# Patient Record
Sex: FEMALE | Race: WHITE | NOT HISPANIC OR LATINO | Employment: UNEMPLOYED | URBAN - METROPOLITAN AREA
[De-identification: names, ages, dates, MRNs, and addresses within clinical notes are randomized per-mention and may not be internally consistent; named-entity substitution may affect disease eponyms.]

---

## 2014-01-23 LAB
PAP RECOMMENDATION EXT: NORMAL
PAP SMEAR: NORMAL

## 2022-08-23 ENCOUNTER — HOSPITAL ENCOUNTER (EMERGENCY)
Facility: HOSPITAL | Age: 42
Discharge: HOME | End: 2022-08-23
Attending: SPECIALIST
Payer: OTHER GOVERNMENT

## 2022-08-23 VITALS
OXYGEN SATURATION: 99 % | TEMPERATURE: 98 F | HEIGHT: 57 IN | DIASTOLIC BLOOD PRESSURE: 79 MMHG | SYSTOLIC BLOOD PRESSURE: 129 MMHG | WEIGHT: 107 LBS | RESPIRATION RATE: 16 BRPM | BODY MASS INDEX: 23.08 KG/M2 | HEART RATE: 79 BPM

## 2022-08-23 DIAGNOSIS — G43.009 MIGRAINE WITHOUT AURA AND WITHOUT STATUS MIGRAINOSUS, NOT INTRACTABLE: Primary | ICD-10-CM

## 2022-08-23 DIAGNOSIS — U07.1 COVID-19: ICD-10-CM

## 2022-08-23 PROCEDURE — 63600175 PHARM REV CODE 636 W HCPCS: Performed by: NURSE PRACTITIONER

## 2022-08-23 PROCEDURE — 25000003 PHARM REV CODE 250: Performed by: NURSE PRACTITIONER

## 2022-08-23 PROCEDURE — 96374 THER/PROPH/DIAG INJ IV PUSH: CPT

## 2022-08-23 PROCEDURE — 99285 EMERGENCY DEPT VISIT HI MDM: CPT | Mod: 25

## 2022-08-23 RX ORDER — BUTALBITAL, ACETAMINOPHEN AND CAFFEINE 50; 325; 40 MG/1; MG/1; MG/1
1 TABLET ORAL EVERY 6 HOURS PRN
Qty: 15 TABLET | Refills: 0 | Status: SHIPPED | OUTPATIENT
Start: 2022-08-23 | End: 2022-09-22

## 2022-08-23 RX ORDER — HYDROCODONE BITARTRATE AND ACETAMINOPHEN 5; 325 MG/1; MG/1
1 TABLET ORAL
Status: COMPLETED | OUTPATIENT
Start: 2022-08-23 | End: 2022-08-23

## 2022-08-23 RX ORDER — DEXAMETHASONE SODIUM PHOSPHATE 4 MG/ML
8 INJECTION, SOLUTION INTRA-ARTICULAR; INTRALESIONAL; INTRAMUSCULAR; INTRAVENOUS; SOFT TISSUE
Status: COMPLETED | OUTPATIENT
Start: 2022-08-23 | End: 2022-08-23

## 2022-08-23 RX ADMIN — HYDROCODONE BITARTRATE AND ACETAMINOPHEN 1 TABLET: 5; 325 TABLET ORAL at 06:08

## 2022-08-23 RX ADMIN — DEXAMETHASONE SODIUM PHOSPHATE 8 MG: 4 INJECTION, SOLUTION INTRA-ARTICULAR; INTRALESIONAL; INTRAMUSCULAR; INTRAVENOUS; SOFT TISSUE at 06:08

## 2022-08-23 NOTE — Clinical Note
"Buffy Turnernifer" Osullivan was seen and treated in our emergency department on 8/23/2022.  She may return to work on 08/25/2022.       If you have any questions or concerns, please don't hesitate to call.      CLARENCE Vidal"

## 2022-08-23 NOTE — DISCHARGE INSTRUCTIONS
Fioricet as needed for headache  For continued symptoms after other symptoms of COVID have resolved then see your primary care physician for further workup or referral to neurology

## 2022-08-23 NOTE — ED PROVIDER NOTES
"Encounter Date: 8/23/2022       History     Chief Complaint   Patient presents with    Headache     Pt reports testing positive for COVID Saturday. Pt reports "my head is shocking me to the left side behind my ear". Pt denies hx of migraines.      42 year old female presents to ER complaining of sharp stabbing pain to left side of head for about 1-2 hours. She describes pain as a shocking pain. She states when the pain hits it causes her to have dizziness. She denies unilateral weakness, nausea, vomiting or vision changes. She was diagnosed with COVID on Saturday.     The history is provided by the patient. No  was used.     Review of patient's allergies indicates:  No Known Allergies  No past medical history on file.  No past surgical history on file.  No family history on file.     Review of Systems   Constitutional: Negative for chills and fever.   Eyes: Negative for visual disturbance.   Respiratory: Negative for shortness of breath.    Gastrointestinal: Negative for vomiting.   Skin: Negative for rash.   Neurological: Positive for dizziness and headaches. Negative for seizures, syncope and weakness.       Physical Exam     Initial Vitals [08/23/22 1717]   BP Pulse Resp Temp SpO2   129/79 79 18 97.7 °F (36.5 °C) 99 %      MAP       --         Physical Exam    Constitutional: She appears well-developed and well-nourished.   HENT:   Head: Normocephalic and atraumatic.   Right Ear: Tympanic membrane normal.   Left Ear: Tympanic membrane normal.   Eyes: EOM are normal.   Neck: Neck supple.   Normal range of motion.  Cardiovascular: Normal rate and regular rhythm.   Pulmonary/Chest: Breath sounds normal. No respiratory distress.   Abdominal: She exhibits no distension.   Musculoskeletal:         General: Normal range of motion.      Cervical back: Normal range of motion and neck supple.     Neurological: She is alert and oriented to person, place, and time. She has normal strength. No cranial " nerve deficit. Coordination and gait normal. GCS eye subscore is 4. GCS verbal subscore is 5. GCS motor subscore is 6.   No pronator drift. Symmetrical facial movements. Heel-to-shin normal bilaterally.    Skin: Skin is warm and dry. Capillary refill takes less than 2 seconds. No rash noted.   Psychiatric: She has a normal mood and affect.         ED Course   Procedures  Labs Reviewed - No data to display       Imaging Results          CT Head Without Contrast (Final result)  Result time 08/23/22 17:56:06    Final result by Carmelo Verma MD (08/23/22 17:56:06)                 Impression:      No acute intracranial findings.      Electronically signed by: Carmelo Verma  Date:    08/23/2022  Time:    17:56             Narrative:    EXAMINATION:  CT HEAD WITHOUT CONTRAST    CLINICAL HISTORY:  Headache, sudden, severe;    TECHNIQUE:  CT imaging of the head performed from the skull base to the vertex without intravenous contrast. DLP 1105 mGycm. Automatic exposure control, adjustment of mA/kV or iterative reconstruction technique was used to reduce radiation.    COMPARISON:  None Available.    FINDINGS:  There is no acute cortical infarct, hemorrhage or mass lesion.  The ventricles are normal in size.    Visualized paranasal sinuses and mastoid air cells are clear.                                 Medications   HYDROcodone-acetaminophen 5-325 mg per tablet 1 tablet (has no administration in time range)   dexamethasone injection 8 mg (8 mg Intravenous Given 8/23/22 1805)     Medical Decision Making:   Differential Diagnosis:   COVID, viral syndrome, ICH, migraine  Clinical Tests:   Radiological Study: Ordered and Reviewed  ED Management:  CT scan of brain negative.  Patient is COVID positive and suspect her symptoms are related to virus.  Patient given Decadron IV in ER and will be discharged home with pain medications to follow-up with her primary care physician is for continuation of symptoms after her 5 days of  quarantine.  Patient remains hemodynamically stable and neurologically intact.                      Clinical Impression:   Final diagnoses:  [G43.009] Migraine without aura and without status migrainosus, not intractable (Primary)  [U07.1] COVID-19          ED Disposition Condition    Discharge Stable        ED Prescriptions     Medication Sig Dispense Start Date End Date Auth. Provider    butalbital-acetaminophen-caffeine -40 mg (FIORICET, ESGIC) -40 mg per tablet Take 1 tablet by mouth every 6 (six) hours as needed for Headaches. 15 tablet 8/23/2022 9/22/2022 CLARENCE Vidal        Follow-up Information    None          CLARENCE Vidal  08/23/22 2118

## 2023-09-21 ENCOUNTER — OFFICE VISIT (OUTPATIENT)
Dept: FAMILY MEDICINE | Facility: CLINIC | Age: 43
End: 2023-09-21
Payer: OTHER GOVERNMENT

## 2023-09-21 VITALS
DIASTOLIC BLOOD PRESSURE: 98 MMHG | HEIGHT: 57 IN | TEMPERATURE: 98 F | WEIGHT: 108.63 LBS | SYSTOLIC BLOOD PRESSURE: 149 MMHG | HEART RATE: 73 BPM | RESPIRATION RATE: 20 BRPM | BODY MASS INDEX: 23.43 KG/M2 | OXYGEN SATURATION: 100 %

## 2023-09-21 DIAGNOSIS — Z98.890 H/O OVARIAN CYSTECTOMY: ICD-10-CM

## 2023-09-21 DIAGNOSIS — I10 HYPERTENSION, UNSPECIFIED TYPE: ICD-10-CM

## 2023-09-21 DIAGNOSIS — Z87.42 H/O OVARIAN CYSTECTOMY: ICD-10-CM

## 2023-09-21 DIAGNOSIS — Z12.31 BREAST CANCER SCREENING BY MAMMOGRAM: ICD-10-CM

## 2023-09-21 DIAGNOSIS — Z76.89 ENCOUNTER TO ESTABLISH CARE: Primary | ICD-10-CM

## 2023-09-21 DIAGNOSIS — R03.0 ELEVATED BLOOD PRESSURE READING: ICD-10-CM

## 2023-09-21 DIAGNOSIS — N92.6 ABNORMAL BLEEDING IN MENSTRUAL CYCLE: ICD-10-CM

## 2023-09-21 PROCEDURE — 99204 PR OFFICE/OUTPT VISIT, NEW, LEVL IV, 45-59 MIN: ICD-10-PCS | Mod: ,,, | Performed by: NURSE PRACTITIONER

## 2023-09-21 PROCEDURE — 99204 OFFICE O/P NEW MOD 45 MIN: CPT | Mod: ,,, | Performed by: NURSE PRACTITIONER

## 2023-09-21 RX ORDER — LISINOPRIL 10 MG/1
10 TABLET ORAL DAILY
Qty: 30 TABLET | Refills: 1 | Status: SHIPPED | OUTPATIENT
Start: 2023-09-21 | End: 2023-10-18 | Stop reason: SDUPTHER

## 2023-09-21 NOTE — PROGRESS NOTES
SUBJECTIVE:     History of Present Illness      Chief Complaint: Establish Care (Having htn says 150/98 average for a few months. confirms having headaches, dizziness and nausea. Having periods every 2 weeks with heavy flow. )    HPI:  Patient is a 43 y.o. year old female who presents to clinic to establish care as a new patient.    Patient states that she is having headaches and dizziness for the past few months.  S  he does report a history of ovarian tumor when she was a teenager.    For the last 3 months she is having heavy menstrual passing clots every 2 weeks .  Patient's fiancee states that she was going to go to the emergency room but decided not to.  Today she presents to the clinic with elevated blood pressure.     Review of Systems:    Review of Systems    12 point review of systems conducted, negative except as stated in the history of present illness. See HPI for details.     Previous History      Review of patient's allergies indicates:  No Known Allergies    History reviewed. No pertinent past medical history.  Current Outpatient Medications   Medication Instructions    lisinopriL 10 mg, Oral, Daily     Past Surgical History:   Procedure Laterality Date    APPENDECTOMY  05/04/1999    OOPHORECTOMY  05/04/1999    right ovary    TUBAL LIGATION  05/04/1999    TUMOR REMOVAL  05/04/1999    right ovary     Family History   Problem Relation Age of Onset    Hypertension Mother     Hypertension Father     Stroke Father     Hypertension Sister     Lupus Sister        Social History     Tobacco Use    Smoking status: Every Day     Types: Vaping with nicotine    Smokeless tobacco: Never   Substance Use Topics    Alcohol use: Yes     Alcohol/week: 3.0 standard drinks of alcohol     Types: 3 Drinks containing 0.5 oz of alcohol per week    Drug use: Never        Health Maintenance      Health Maintenance   Topic Date Due    Hepatitis C Screening  Never done    Lipid Panel  Never done    TETANUS VACCINE  Never done  "   Mammogram  01/30/2015       OBJECTIVE:     Physical Exam      Vital Signs Reviewed   Visit Vitals  BP (!) 149/98   Pulse 73   Temp 98 °F (36.7 °C)   Resp 20   Ht 4' 9" (1.448 m)   Wt 49.3 kg (108 lb 9.6 oz)   LMP 09/04/2023 (Approximate)   SpO2 100%   BMI 23.50 kg/m²       Physical Exam    Physical Exam:  General: Alert, well nourished, no acute distress, non-toxic appearing.   Eyes: Anicteric sclera, without conjunctival injection, normal lids, no purulent drainage, EOMs grossly intact.   Ears: No tragal tenderness. Tympanic membranes intact, pearly grey, without effusion or erythema and with a positive light reflex.   Mouth: Posterior pharynx without erythema. No exudate, ulcerations, or lesion. No tonsillar swelling.   Neck: Supple, full ROM, no rigidity, no cervical adenopathy.   Cardio: Normal rate and rhythm    Resp: Respirations even and unlabored, clear to auscultation bilaterally.   Abd: No ecchymosis or distension. Normal bowel sounds in all 4 quadrants. No tenderness to palpation. No rebound tenderness or guarding. No CVA tenderness.   Skin: No rashes or open lesions noted.   MSK: No swelling. No abrasions or signs of trauma. Ambulating without assistance.   Neuro: Alert,oriented No focal deficits noted. Facial expressions even.   Psych: Cooperative, Normal affect      Procedures    Procedures     Labs   No results found for this or any previous visit.    Chemistry:  No results found for: "NA", "K", "CHLORIDE", "BUN", "CREATININE", "EGFRNORACEVR", "GLUCOSE", "CALCIUM", "ALKPHOS", "LABPROT", "ALBUMIN", "BILIDIR", "IBILI", "AST", "ALT", "MG", "PHOS", "DDVWLHFF81YM", "TSH", "[id number]", "PSA"     No results found for: "HGBA1C", "MICROALBCREA"     Hematology:  No results found for: "WBC", "HGB", "HCT", "PLT"    Lipid Panel:  No results found for: "CHOL", "HDL", "LDL", "TRIG", "TOTALCHOLEST"     Urine:  Lab Results   Component Value Date    APPEARANCEUA Clear 03/18/2019    PROTEINUA Negative 03/18/2019    " LEUKOCYTESUR 75 (A) 03/18/2019    RBCUA >=100 (A) 03/18/2019    WBCUA 6-10 (A) 03/18/2019    BACTERIA None Seen 03/18/2019    SQEPUA 2-20 03/18/2019    HYALINECASTS 0-2 (A) 03/18/2019         Assessment            ICD-10-CM ICD-9-CM   1. Encounter to establish care  Z76.89 V65.8   2. Hypertension, unspecified type  I10 401.9   3. Abnormal bleeding in menstrual cycle  N93.9 626.9   4. H/O ovarian cystectomy  Z98.890 V15.29    Z87.42    5. Breast cancer screening by mammogram  Z12.31 V76.12   6. Elevated blood pressure reading  R03.0 796.2       Plan       1. Encounter to establish care  - CBC Auto Differential; Future  - Comprehensive Metabolic Panel; Future  - Lipid Panel; Future  - TSH; Future  - Hemoglobin A1C; Future  - Urinalysis; Future  - T4, Free; Future  - Vitamin D; Future  - Iron and TIBC; Future  - Urinalysis    2. Hypertension, unspecified type  Start - lisinopriL 10 MG tablet; Take 1 tablet (10 mg total) by mouth once daily.  Dispense: 30 tablet; Refill: 1    Continue current medication as prescribed   Low salt/ DASH diet   Discussed lifestyle modifications.   encourage aerobic excise at least 30 mins a day   Monitor BP daily goal less than 140/90.   ED precautions discussed      3. Abnormal bleeding in menstrual cycle  - Ambulatory referral/consult to Obstetrics / Gynecology; Future  - US Pelvis Complete Non OB; Future  - Iron and TIBC; Future    4. H/O ovarian cystectomy  - Ambulatory referral/consult to Obstetrics / Gynecology; Future  - US Pelvis Complete Non OB; Future    5. Breast cancer screening by mammogram  - Mammo Digital Screening Bilat w/ Brandyn; Future    6. Elevated blood pressure reading    Orders Placed This Encounter    US Pelvis Complete Non OB    Mammo Digital Screening Bilat w/ Brandyn    CBC Auto Differential    Comprehensive Metabolic Panel    Lipid Panel    TSH    Hemoglobin A1C    Urinalysis    T4, Free    Vitamin D    Iron and TIBC    Ambulatory referral/consult to Obstetrics /  Gynecology    lisinopriL 10 MG tablet          No follow-ups on file.   No follow-ups on file. In addition to their scheduled follow up, the patient has also been instructed to follow up on as needed basis.   Future Appointments   Date Time Provider Department Center   9/27/2023  2:00 PM [facility]   9/27/2023  2:30 PM [facility] US1 [facility] US [facility]   10/18/2023  1:30 PM Lyla Jimenez, [facility]

## 2023-09-25 PROBLEM — R03.0 ELEVATED BLOOD PRESSURE READING: Status: ACTIVE | Noted: 2023-09-25

## 2023-09-27 ENCOUNTER — HOSPITAL ENCOUNTER (OUTPATIENT)
Dept: RADIOLOGY | Facility: HOSPITAL | Age: 43
Discharge: HOME | End: 2023-09-27
Attending: NURSE PRACTITIONER
Payer: OTHER GOVERNMENT

## 2023-09-27 DIAGNOSIS — N63.0 LUMP IN FEMALE BREAST: Primary | ICD-10-CM

## 2023-09-27 DIAGNOSIS — Z12.31 BREAST CANCER SCREENING BY MAMMOGRAM: ICD-10-CM

## 2023-09-27 DIAGNOSIS — Z87.42 H/O OVARIAN CYSTECTOMY: ICD-10-CM

## 2023-09-27 DIAGNOSIS — N92.6 ABNORMAL BLEEDING IN MENSTRUAL CYCLE: ICD-10-CM

## 2023-09-27 DIAGNOSIS — Z98.890 H/O OVARIAN CYSTECTOMY: ICD-10-CM

## 2023-09-27 PROCEDURE — 76830 TRANSVAGINAL US NON-OB: CPT | Mod: TC

## 2023-09-29 ENCOUNTER — LAB VISIT (OUTPATIENT)
Dept: LAB | Facility: HOSPITAL | Age: 43
End: 2023-09-29
Attending: NURSE PRACTITIONER
Payer: OTHER GOVERNMENT

## 2023-09-29 DIAGNOSIS — N92.6 ABNORMAL BLEEDING IN MENSTRUAL CYCLE: ICD-10-CM

## 2023-09-29 DIAGNOSIS — Z76.89 ENCOUNTER TO ESTABLISH CARE: ICD-10-CM

## 2023-09-29 LAB
ALBUMIN SERPL-MCNC: 4.4 G/DL (ref 3.5–5)
ALBUMIN/GLOB SERPL: 1.3 RATIO (ref 1.1–2)
ALP SERPL-CCNC: 60 UNIT/L (ref 40–150)
ALT SERPL-CCNC: 13 UNIT/L (ref 0–55)
APPEARANCE UR: CLEAR
AST SERPL-CCNC: 18 UNIT/L (ref 5–34)
BACTERIA #/AREA URNS AUTO: ABNORMAL /HPF
BASOPHILS # BLD AUTO: 0.03 X10(3)/MCL
BASOPHILS NFR BLD AUTO: 0.5 %
BILIRUB SERPL-MCNC: 0.3 MG/DL
BILIRUB UR QL STRIP.AUTO: NEGATIVE
BUN SERPL-MCNC: 8.4 MG/DL (ref 7–18.7)
CALCIUM SERPL-MCNC: 9.1 MG/DL (ref 8.4–10.2)
CHLORIDE SERPL-SCNC: 106 MMOL/L (ref 98–107)
CHOLEST SERPL-MCNC: 169 MG/DL
CHOLEST/HDLC SERPL: 3 {RATIO} (ref 0–5)
CO2 SERPL-SCNC: 24 MMOL/L (ref 22–29)
COLOR UR AUTO: YELLOW
CREAT SERPL-MCNC: 0.85 MG/DL (ref 0.55–1.02)
DEPRECATED CALCIDIOL+CALCIFEROL SERPL-MC: 42.9 NG/ML (ref 30–80)
EOSINOPHIL # BLD AUTO: 0.12 X10(3)/MCL (ref 0–0.9)
EOSINOPHIL NFR BLD AUTO: 1.9 %
ERYTHROCYTE [DISTWIDTH] IN BLOOD BY AUTOMATED COUNT: 12.6 % (ref 11.5–17)
EST. AVERAGE GLUCOSE BLD GHB EST-MCNC: 93.9 MG/DL
GFR SERPLBLD CREATININE-BSD FMLA CKD-EPI: >60 MLS/MIN/1.73/M2
GLOBULIN SER-MCNC: 3.3 GM/DL (ref 2.4–3.5)
GLUCOSE SERPL-MCNC: 96 MG/DL (ref 74–100)
GLUCOSE UR QL STRIP.AUTO: NEGATIVE
HBA1C MFR BLD: 4.9 %
HCT VFR BLD AUTO: 40.6 % (ref 37–47)
HDLC SERPL-MCNC: 57 MG/DL (ref 35–60)
HGB BLD-MCNC: 12.1 G/DL (ref 12–16)
IMM GRANULOCYTES # BLD AUTO: 0 X10(3)/MCL (ref 0–0.04)
IMM GRANULOCYTES NFR BLD AUTO: 0 %
IRON SATN MFR SERPL: 8 % (ref 20–50)
IRON SERPL-MCNC: 31 UG/DL (ref 50–170)
KETONES UR QL STRIP.AUTO: NEGATIVE
LDLC SERPL CALC-MCNC: 104 MG/DL (ref 50–140)
LEUKOCYTE ESTERASE UR QL STRIP.AUTO: NEGATIVE
LYMPHOCYTES # BLD AUTO: 1.67 X10(3)/MCL (ref 0.6–4.6)
LYMPHOCYTES NFR BLD AUTO: 26.7 %
MCH RBC QN AUTO: 25.5 PG (ref 27–31)
MCHC RBC AUTO-ENTMCNC: 29.8 G/DL (ref 33–36)
MCV RBC AUTO: 85.5 FL (ref 80–94)
MONOCYTES # BLD AUTO: 0.51 X10(3)/MCL (ref 0.1–1.3)
MONOCYTES NFR BLD AUTO: 8.2 %
NEUTROPHILS # BLD AUTO: 3.92 X10(3)/MCL (ref 2.1–9.2)
NEUTROPHILS NFR BLD AUTO: 62.7 %
NITRITE UR QL STRIP.AUTO: NEGATIVE
PH UR STRIP.AUTO: 5 [PH]
PLATELET # BLD AUTO: 292 X10(3)/MCL (ref 130–400)
PMV BLD AUTO: 9.8 FL (ref 7.4–10.4)
POTASSIUM SERPL-SCNC: 4.1 MMOL/L (ref 3.5–5.1)
PROT SERPL-MCNC: 7.7 GM/DL (ref 6.4–8.3)
PROT UR QL STRIP.AUTO: ABNORMAL
RBC # BLD AUTO: 4.75 X10(6)/MCL (ref 4.2–5.4)
RBC #/AREA URNS AUTO: ABNORMAL /HPF
RBC UR QL AUTO: ABNORMAL
SODIUM SERPL-SCNC: 140 MMOL/L (ref 136–145)
SP GR UR STRIP.AUTO: >=1.03 (ref 1–1.03)
SQUAMOUS #/AREA URNS AUTO: ABNORMAL /HPF
T4 FREE SERPL-MCNC: 1.07 NG/DL (ref 0.7–1.48)
TIBC SERPL-MCNC: 379 UG/DL (ref 70–310)
TIBC SERPL-MCNC: 410 UG/DL (ref 250–450)
TRANSFERRIN SERPL-MCNC: 366 MG/DL (ref 180–382)
TRIGL SERPL-MCNC: 42 MG/DL (ref 37–140)
TSH SERPL-ACNC: 2 UIU/ML (ref 0.35–4.94)
UROBILINOGEN UR STRIP-ACNC: 0.2
VLDLC SERPL CALC-MCNC: 8 MG/DL
WBC # SPEC AUTO: 6.25 X10(3)/MCL (ref 4.5–11.5)
WBC #/AREA URNS AUTO: ABNORMAL /HPF

## 2023-09-29 PROCEDURE — 83036 HEMOGLOBIN GLYCOSYLATED A1C: CPT

## 2023-09-29 PROCEDURE — 85025 COMPLETE CBC W/AUTO DIFF WBC: CPT

## 2023-09-29 PROCEDURE — 83540 ASSAY OF IRON: CPT

## 2023-09-29 PROCEDURE — 84443 ASSAY THYROID STIM HORMONE: CPT

## 2023-09-29 PROCEDURE — 36415 COLL VENOUS BLD VENIPUNCTURE: CPT

## 2023-09-29 PROCEDURE — 80053 COMPREHEN METABOLIC PANEL: CPT

## 2023-09-29 PROCEDURE — 84439 ASSAY OF FREE THYROXINE: CPT

## 2023-09-29 PROCEDURE — 80061 LIPID PANEL: CPT

## 2023-09-29 PROCEDURE — 82306 VITAMIN D 25 HYDROXY: CPT

## 2023-10-09 ENCOUNTER — PATIENT MESSAGE (OUTPATIENT)
Dept: ADMINISTRATIVE | Facility: HOSPITAL | Age: 43
End: 2023-10-09
Payer: OTHER GOVERNMENT

## 2023-10-09 DIAGNOSIS — Z12.31 BREAST CANCER SCREENING BY MAMMOGRAM: Primary | ICD-10-CM

## 2023-10-18 ENCOUNTER — OFFICE VISIT (OUTPATIENT)
Dept: FAMILY MEDICINE | Facility: CLINIC | Age: 43
End: 2023-10-18
Payer: OTHER GOVERNMENT

## 2023-10-18 VITALS
TEMPERATURE: 98 F | HEART RATE: 56 BPM | BODY MASS INDEX: 22.85 KG/M2 | WEIGHT: 105.63 LBS | SYSTOLIC BLOOD PRESSURE: 104 MMHG | DIASTOLIC BLOOD PRESSURE: 70 MMHG | OXYGEN SATURATION: 99 %

## 2023-10-18 DIAGNOSIS — Z00.00 WELLNESS EXAMINATION: Primary | ICD-10-CM

## 2023-10-18 DIAGNOSIS — D64.9 ANEMIA, UNSPECIFIED TYPE: ICD-10-CM

## 2023-10-18 DIAGNOSIS — I10 HYPERTENSION, UNSPECIFIED TYPE: ICD-10-CM

## 2023-10-18 DIAGNOSIS — Z98.890 H/O OVARIAN CYSTECTOMY: ICD-10-CM

## 2023-10-18 DIAGNOSIS — Z87.42 H/O OVARIAN CYSTECTOMY: ICD-10-CM

## 2023-10-18 PROCEDURE — 99396 PR PREVENTIVE VISIT,EST,40-64: ICD-10-PCS | Mod: ,,, | Performed by: NURSE PRACTITIONER

## 2023-10-18 PROCEDURE — 99396 PREV VISIT EST AGE 40-64: CPT | Mod: ,,, | Performed by: NURSE PRACTITIONER

## 2023-10-18 RX ORDER — FERROUS SULFATE 325(65) MG
325 TABLET, DELAYED RELEASE (ENTERIC COATED) ORAL
Qty: 60 TABLET | Refills: 0 | Status: SHIPPED | OUTPATIENT
Start: 2023-10-18 | End: 2024-02-05 | Stop reason: SDUPTHER

## 2023-10-18 RX ORDER — LISINOPRIL 10 MG/1
10 TABLET ORAL DAILY
Qty: 30 TABLET | Refills: 1 | Status: SHIPPED | OUTPATIENT
Start: 2023-10-18 | End: 2024-10-17

## 2023-10-18 NOTE — PROGRESS NOTES
SUBJECTIVE:     History of Present Illness      Chief Complaint: wellness with lab review    HPI:  Patient is a 43 y.o. year old female who presents to clinic for annual wellness and lab review.  The patient's general health status described as fair.  Patient reports since starting lisinopril her blood pressure has improved.   She is no longer having headaches.      She is still having having menstrual cycles he does have appointment with gyn doctor in  December.  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  Noted  iron deficiency on lab work.       Review of Systems:    Review of Systems    12 point review of systems conducted, negative except as stated in the history of present illness. See HPI for details.     Previous History      Review of patient's allergies indicates:  No Known Allergies    History reviewed. No pertinent past medical history.  Current Outpatient Medications   Medication Instructions    ferrous sulfate 325 mg, Oral, 3 times daily with meals    lisinopriL 10 mg, Oral, Daily     Past Surgical History:   Procedure Laterality Date    APPENDECTOMY  05/04/1999    OOPHORECTOMY  05/04/1999    right ovary    TUBAL LIGATION  05/04/1999    TUMOR REMOVAL  05/04/1999    right ovary     Family History   Problem Relation Age of Onset    Hypertension Mother     Hypertension Father     Stroke Father     Hypertension Sister     Lupus Sister        Social History     Tobacco Use    Smoking status: Every Day     Types: Vaping with nicotine    Smokeless tobacco: Never   Substance Use Topics    Alcohol use: Yes     Alcohol/week: 3.0 standard drinks of alcohol     Types: 3 Drinks containing 0.5 oz of alcohol per week    Drug use: Never        Health Maintenance      Health Maintenance   Topic Date Due    Hepatitis C Screening  Never done    TETANUS VACCINE  Never done    Mammogram  01/30/2015    Lipid Panel  Completed       OBJECTIVE:     Physical Exam      Vital Signs Reviewed   Visit Vitals  /70   Pulse  (!) 56   Temp 98.1 °F (36.7 °C)   Wt 47.9 kg (105 lb 9.6 oz)   LMP 10/18/2023   SpO2 99%   BMI 22.85 kg/m²       Physical Exam    Physical Exam:  General: Alert, well nourished, no acute distress, non-toxic appearing.   Eyes: Anicteric sclera, without conjunctival injection, normal lids, no purulent drainage, EOMs grossly intact.   Ears: No tragal tenderness. Tympanic membranes intact, pearly grey, without effusion or erythema and with a positive light reflex.   Mouth: Posterior pharynx without erythema. No exudate, ulcerations, or lesion. No tonsillar swelling.   Neck: Supple, full ROM, no rigidity, no cervical adenopathy.   Cardio: Normal rate and rhythm    Resp: Respirations even and unlabored, clear to auscultation bilaterally.   Abd: No ecchymosis or distension. Normal bowel sounds in all 4 quadrants. No tenderness to palpation. No rebound tenderness or guarding. No CVA tenderness.   Skin: No rashes or open lesions noted.   MSK: No swelling. No abrasions or signs of trauma. Ambulating without assistance.   Neuro: Alert,oriented No focal deficits noted. Facial expressions even.   Psych: Cooperative, Normal affect      Procedures    Procedures     Labs     Results for orders placed or performed in visit on 09/29/23   Comprehensive Metabolic Panel   Result Value Ref Range    Sodium Level 140 136 - 145 mmol/L    Potassium Level 4.1 3.5 - 5.1 mmol/L    Chloride 106 98 - 107 mmol/L    Carbon Dioxide 24 22 - 29 mmol/L    Glucose Level 96 74 - 100 mg/dL    Blood Urea Nitrogen 8.4 7.0 - 18.7 mg/dL    Creatinine 0.85 0.55 - 1.02 mg/dL    Calcium Level Total 9.1 8.4 - 10.2 mg/dL    Protein Total 7.7 6.4 - 8.3 gm/dL    Albumin Level 4.4 3.5 - 5.0 g/dL    Globulin 3.3 2.4 - 3.5 gm/dL    Albumin/Globulin Ratio 1.3 1.1 - 2.0 ratio    Bilirubin Total 0.3 <=1.5 mg/dL    Alkaline Phosphatase 60 40 - 150 unit/L    Alanine Aminotransferase 13 0 - 55 unit/L    Aspartate Aminotransferase 18 5 - 34 unit/L    eGFR >60 mls/min/1.73/m2    Lipid Panel   Result Value Ref Range    Cholesterol Total 169 <=200 mg/dL    HDL Cholesterol 57 35 - 60 mg/dL    Triglyceride 42 37 - 140 mg/dL    Cholesterol/HDL Ratio 3 0 - 5    Very Low Density Lipoprotein 8     LDL Cholesterol 104.00 50.00 - 140.00 mg/dL   TSH   Result Value Ref Range    TSH 1.999 0.350 - 4.940 uIU/mL   Hemoglobin A1C   Result Value Ref Range    Hemoglobin A1c 4.9 <=7.0 %    Estimated Average Glucose 93.9 mg/dL   T4, Free   Result Value Ref Range    Thyroxine Free 1.07 0.70 - 1.48 ng/dL   Vitamin D   Result Value Ref Range    Vit D 25 OH 42.9 30.0 - 80.0 ng/mL   Iron and TIBC   Result Value Ref Range    Iron Binding Capacity Unsaturated 379 (H) 70 - 310 ug/dL    Iron Level 31 (L) 50 - 170 ug/dL    Transferrin 366 180 - 382 mg/dL    Iron Binding Capacity Total 410 250 - 450 ug/dL    Iron Saturation 8 (L) 20 - 50 %   CBC with Differential   Result Value Ref Range    WBC 6.25 4.50 - 11.50 x10(3)/mcL    RBC 4.75 4.20 - 5.40 x10(6)/mcL    Hgb 12.1 12.0 - 16.0 g/dL    Hct 40.6 37.0 - 47.0 %    MCV 85.5 80.0 - 94.0 fL    MCH 25.5 (L) 27.0 - 31.0 pg    MCHC 29.8 (L) 33.0 - 36.0 g/dL    RDW 12.6 11.5 - 17.0 %    Platelet 292 130 - 400 x10(3)/mcL    MPV 9.8 7.4 - 10.4 fL    Neut % 62.7 %    Lymph % 26.7 %    Mono % 8.2 %    Eos % 1.9 %    Basophil % 0.5 %    Lymph # 1.67 0.6 - 4.6 x10(3)/mcL    Neut # 3.92 2.1 - 9.2 x10(3)/mcL    Mono # 0.51 0.1 - 1.3 x10(3)/mcL    Eos # 0.12 0 - 0.9 x10(3)/mcL    Baso # 0.03 <=0.2 x10(3)/mcL    IG# 0.00 0 - 0.04 x10(3)/mcL    IG% 0.0 %       Chemistry:  Lab Results   Component Value Date     09/29/2023    K 4.1 09/29/2023    CHLORIDE 106 09/29/2023    BUN 8.4 09/29/2023    CREATININE 0.85 09/29/2023    EGFRNORACEVR >60 09/29/2023    GLUCOSE 96 09/29/2023    CALCIUM 9.1 09/29/2023    ALKPHOS 60 09/29/2023    LABPROT 7.7 09/29/2023    ALBUMIN 4.4 09/29/2023    AST 18 09/29/2023    ALT 13 09/29/2023    [id number] 42.9 09/29/2023    TSH 1.999 09/29/2023     [id number] 1.07 09/29/2023        Lab Results   Component Value Date    HGBA1C 4.9 09/29/2023        Hematology:  Lab Results   Component Value Date    WBC 6.25 09/29/2023    HGB 12.1 09/29/2023    HCT 40.6 09/29/2023     09/29/2023       Lipid Panel:  Lab Results   Component Value Date    CHOL 169 09/29/2023    HDL 57 09/29/2023    .00 09/29/2023    TRIG 42 09/29/2023    TOTALCHOLEST 3 09/29/2023        Urine:  Lab Results   Component Value Date    COLORUA Yellow 09/29/2023    APPEARANCEUA Clear 09/29/2023    SGUA >=1.030 09/29/2023    PHUA 5.0 09/29/2023    PROTEINUA Trace (A) 09/29/2023    GLUCOSEUA Negative 09/29/2023    KETONESUA Negative 09/29/2023    BLOODUA Small (A) 09/29/2023    NITRITESUA Negative 09/29/2023    LEUKOCYTESUR Negative 09/29/2023    RBCUA 3-5 09/29/2023    WBCUA None Seen 09/29/2023    BACTERIA None Seen 09/29/2023    SQEPUA 2-20 03/18/2019    HYALINECASTS 0-2 (A) 03/18/2019         Assessment            ICD-10-CM ICD-9-CM   1. Wellness examination  Z00.00 V70.0   2. Hypertension, unspecified type  I10 401.9   3. H/O ovarian cystectomy  Z98.890 V15.29    Z87.42    4. Anemia, unspecified type  D64.9 285.9       Plan       1. Wellness examination  Discussed labs and preventative screenings   Overall health status reviewed.    Significant chronic conditions addressed, including ongoing treatment plans.   Good health habits reinforced.    Cardiovascular disease risk factors discussed.   Appropriate recommendations and preventative care medical   information provided with annual wellness exams encouraged.  Vaccination status   Mammo scheduled     Cervical     2. Hypertension, unspecified type  Improved  Controlled with medication.   Continue current medication as prescribed   Low salt/ DASH diet   Discussed lifestyle modifications.   encourage aerobic excise at least 30 mins a day   Monitor BP daily goal less than 140/90.   Denies headaches, blurred vision, or dizziness    -  lisinopriL 10 MG tablet; Take 1 tablet (10 mg total) by mouth once daily.  Dispense: 30 tablet; Refill: 1    3. H/O ovarian cystectomy    4. Anemia, unspecified type  - ferrous sulfate 325 (65 FE) MG EC tablet; Take 1 tablet (325 mg total) by mouth 3 (three) times daily with meals.  Dispense: 60 tablet; Refill: 0  - Iron and TIBC; Future repeat labs in 3 months   - CBC Auto Differential; Future  - Ferritin; Future    Orders Placed This Encounter    Iron and TIBC    CBC Auto Differential    Ferritin    ferrous sulfate 325 (65 FE) MG EC tablet    lisinopriL 10 MG tablet      Medication List with Changes/Refills   New Medications    FERROUS SULFATE 325 (65 FE) MG EC TABLET    Take 1 tablet (325 mg total) by mouth 3 (three) times daily with meals.   Changed and/or Refilled Medications    Modified Medication Previous Medication    LISINOPRIL 10 MG TABLET lisinopriL 10 MG tablet       Take 1 tablet (10 mg total) by mouth once daily.    Take 1 tablet (10 mg total) by mouth once daily.       No follow-ups on file.   No follow-ups on file. In addition to their scheduled follow up, the patient has also been instructed to follow up on as needed basis.   Future Appointments   Date Time Provider Department Center   10/25/2023  8:45 AM [facility] MAMMO[facility] DX1 [facility]   10/25/2023  9:30 AM [facility]   12/6/2023  9:45 AM Favian Roblero MD [facility]   1/18/2024  8:45 AM Lyla Jimenez FNP [facility]   10/24/2024  1:00 PM Lyla Jimenez FNP [facility]

## 2023-10-25 ENCOUNTER — HOSPITAL ENCOUNTER (OUTPATIENT)
Dept: RADIOLOGY | Facility: HOSPITAL | Age: 43
Discharge: HOME | End: 2023-10-25
Attending: NURSE PRACTITIONER
Payer: OTHER GOVERNMENT

## 2023-10-25 VITALS — BODY MASS INDEX: 23.3 KG/M2 | WEIGHT: 108 LBS | HEIGHT: 57 IN

## 2023-10-25 DIAGNOSIS — Z12.31 BREAST CANCER SCREENING BY MAMMOGRAM: ICD-10-CM

## 2023-10-25 DIAGNOSIS — N63.0 LUMP IN FEMALE BREAST: ICD-10-CM

## 2023-10-25 PROCEDURE — 77066 DX MAMMO INCL CAD BI: CPT | Mod: 26,,, | Performed by: RADIOLOGY

## 2023-10-25 PROCEDURE — 76642 ULTRASOUND BREAST LIMITED: CPT | Mod: TC,RT

## 2023-10-25 PROCEDURE — 77062 BREAST TOMOSYNTHESIS BI: CPT | Mod: TC

## 2023-10-25 PROCEDURE — 77066 MAMMO DIGITAL DIAGNOSTIC BILAT WITH TOMO: ICD-10-PCS | Mod: 26,,, | Performed by: RADIOLOGY

## 2023-10-25 PROCEDURE — 77062 BREAST TOMOSYNTHESIS BI: CPT | Mod: 26,,, | Performed by: RADIOLOGY

## 2023-10-25 PROCEDURE — 77062 MAMMO DIGITAL DIAGNOSTIC BILAT WITH TOMO: ICD-10-PCS | Mod: 26,,, | Performed by: RADIOLOGY

## 2023-10-25 PROCEDURE — 76642 US BREAST RIGHT LIMITED: ICD-10-PCS | Mod: 26,RT,, | Performed by: RADIOLOGY

## 2023-10-25 PROCEDURE — 76642 ULTRASOUND BREAST LIMITED: CPT | Mod: 26,RT,, | Performed by: RADIOLOGY

## 2023-10-30 ENCOUNTER — HOSPITAL ENCOUNTER (EMERGENCY)
Facility: HOSPITAL | Age: 43
Discharge: HOME | End: 2023-10-30
Attending: FAMILY MEDICINE
Payer: OTHER GOVERNMENT

## 2023-10-30 VITALS
SYSTOLIC BLOOD PRESSURE: 96 MMHG | OXYGEN SATURATION: 100 % | RESPIRATION RATE: 16 BRPM | HEART RATE: 60 BPM | DIASTOLIC BLOOD PRESSURE: 62 MMHG | HEIGHT: 57 IN | TEMPERATURE: 98 F | BODY MASS INDEX: 22.22 KG/M2 | WEIGHT: 103 LBS

## 2023-10-30 DIAGNOSIS — R52 PAIN: ICD-10-CM

## 2023-10-30 DIAGNOSIS — R10.9 ABDOMINAL PAIN, UNSPECIFIED ABDOMINAL LOCATION: Primary | ICD-10-CM

## 2023-10-30 LAB
ALBUMIN SERPL-MCNC: 4.2 G/DL (ref 3.5–5)
ALBUMIN/GLOB SERPL: 1.2 RATIO (ref 1.1–2)
ALP SERPL-CCNC: 53 UNIT/L (ref 40–150)
ALT SERPL-CCNC: 15 UNIT/L (ref 0–55)
APPEARANCE UR: CLEAR
AST SERPL-CCNC: 31 UNIT/L (ref 5–34)
BACTERIA #/AREA URNS AUTO: ABNORMAL /HPF
BASOPHILS # BLD AUTO: 0.05 X10(3)/MCL
BASOPHILS NFR BLD AUTO: 0.5 %
BILIRUB SERPL-MCNC: 0.2 MG/DL
BILIRUB UR QL STRIP.AUTO: NEGATIVE
BUN SERPL-MCNC: 11.6 MG/DL (ref 7–18.7)
CALCIUM SERPL-MCNC: 9.3 MG/DL (ref 8.4–10.2)
CHLORIDE SERPL-SCNC: 107 MMOL/L (ref 98–107)
CO2 SERPL-SCNC: 23 MMOL/L (ref 22–29)
COLOR UR AUTO: YELLOW
CREAT SERPL-MCNC: 0.88 MG/DL (ref 0.55–1.02)
EOSINOPHIL # BLD AUTO: 0.03 X10(3)/MCL (ref 0–0.9)
EOSINOPHIL NFR BLD AUTO: 0.3 %
ERYTHROCYTE [DISTWIDTH] IN BLOOD BY AUTOMATED COUNT: 14.6 % (ref 11.5–17)
GFR SERPLBLD CREATININE-BSD FMLA CKD-EPI: >60 MLS/MIN/1.73/M2
GLOBULIN SER-MCNC: 3.5 GM/DL (ref 2.4–3.5)
GLUCOSE SERPL-MCNC: 91 MG/DL (ref 74–100)
GLUCOSE UR QL STRIP.AUTO: NEGATIVE
HCT VFR BLD AUTO: 36.9 % (ref 37–47)
HGB BLD-MCNC: 10.9 G/DL (ref 12–16)
IMM GRANULOCYTES # BLD AUTO: 0.02 X10(3)/MCL (ref 0–0.04)
IMM GRANULOCYTES NFR BLD AUTO: 0.2 %
KETONES UR QL STRIP.AUTO: NEGATIVE
LEUKOCYTE ESTERASE UR QL STRIP.AUTO: NEGATIVE
LIPASE SERPL-CCNC: 16 U/L
LYMPHOCYTES # BLD AUTO: 1.55 X10(3)/MCL (ref 0.6–4.6)
LYMPHOCYTES NFR BLD AUTO: 14.3 %
MCH RBC QN AUTO: 25.7 PG (ref 27–31)
MCHC RBC AUTO-ENTMCNC: 29.5 G/DL (ref 33–36)
MCV RBC AUTO: 87 FL (ref 80–94)
MONOCYTES # BLD AUTO: 0.61 X10(3)/MCL (ref 0.1–1.3)
MONOCYTES NFR BLD AUTO: 5.6 %
NEUTROPHILS # BLD AUTO: 8.57 X10(3)/MCL (ref 2.1–9.2)
NEUTROPHILS NFR BLD AUTO: 79.1 %
NITRITE UR QL STRIP.AUTO: NEGATIVE
PH UR STRIP.AUTO: 5.5 [PH]
PLATELET # BLD AUTO: 233 X10(3)/MCL (ref 130–400)
PMV BLD AUTO: 10.2 FL (ref 7.4–10.4)
POTASSIUM SERPL-SCNC: 4.9 MMOL/L (ref 3.5–5.1)
PROT SERPL-MCNC: 7.7 GM/DL (ref 6.4–8.3)
PROT UR QL STRIP.AUTO: NEGATIVE
RBC # BLD AUTO: 4.24 X10(6)/MCL (ref 4.2–5.4)
RBC #/AREA URNS AUTO: ABNORMAL /HPF
RBC UR QL AUTO: ABNORMAL
SODIUM SERPL-SCNC: 138 MMOL/L (ref 136–145)
SP GR UR STRIP.AUTO: 1.02 (ref 1–1.03)
SQUAMOUS #/AREA URNS AUTO: ABNORMAL /HPF
UROBILINOGEN UR STRIP-ACNC: 0.2
WBC # SPEC AUTO: 10.83 X10(3)/MCL (ref 4.5–11.5)
WBC #/AREA URNS AUTO: ABNORMAL /HPF

## 2023-10-30 PROCEDURE — 96360 HYDRATION IV INFUSION INIT: CPT

## 2023-10-30 PROCEDURE — 25000003 PHARM REV CODE 250: Performed by: FAMILY MEDICINE

## 2023-10-30 PROCEDURE — 83690 ASSAY OF LIPASE: CPT | Performed by: FAMILY MEDICINE

## 2023-10-30 PROCEDURE — 80053 COMPREHEN METABOLIC PANEL: CPT | Performed by: FAMILY MEDICINE

## 2023-10-30 PROCEDURE — 81003 URINALYSIS AUTO W/O SCOPE: CPT | Performed by: FAMILY MEDICINE

## 2023-10-30 PROCEDURE — 99285 EMERGENCY DEPT VISIT HI MDM: CPT | Mod: 25

## 2023-10-30 PROCEDURE — 85025 COMPLETE CBC W/AUTO DIFF WBC: CPT | Performed by: FAMILY MEDICINE

## 2023-10-30 PROCEDURE — 93005 ELECTROCARDIOGRAM TRACING: CPT

## 2023-10-30 RX ADMIN — SODIUM CHLORIDE 1000 ML: 9 INJECTION, SOLUTION INTRAVENOUS at 08:10

## 2023-10-30 NOTE — Clinical Note
"Buffy"Alexys Osullivan was seen and treated in our emergency department on 10/30/2023.  She may return to work on 10/31/2023.       If you have any questions or concerns, please don't hesitate to call.      Judah Garces MD"

## 2023-10-30 NOTE — ED PROVIDER NOTES
Encounter Date: 10/30/2023       History     Chief Complaint   Patient presents with    Abdominal Pain     Pt c/o weakness, abd pain, low BP, nausea and dizziness that started last night, pt reports being on iron pills and lisinopril 10mg QD     Abdominal pain   43-year-old female patient comes in with abdominal pain low blood pressure bradycardia for 1 day patient otherwise has nausea vomiting no diarrhea no fever chills or night sweats no chronic history contributing to today's episode patient is the source of her own history        Review of patient's allergies indicates:  No Known Allergies  No past medical history on file.  Past Surgical History:   Procedure Laterality Date    APPENDECTOMY  05/04/1999    OOPHORECTOMY  05/04/1999    right ovary    TUBAL LIGATION  05/04/1999    TUMOR REMOVAL  05/04/1999    right ovary     Family History   Problem Relation Age of Onset    Hypertension Mother     Hypertension Father     Stroke Father     Hypertension Sister     Lupus Sister     Breast cancer Neg Hx         neg fam history of breast cancer     Social History     Tobacco Use    Smoking status: Every Day     Types: Vaping with nicotine    Smokeless tobacco: Never   Substance Use Topics    Alcohol use: Yes     Alcohol/week: 3.0 standard drinks of alcohol     Types: 3 Drinks containing 0.5 oz of alcohol per week    Drug use: Never     Review of Systems   Constitutional:  Negative for fever.   HENT:  Negative for sore throat.    Respiratory:  Negative for shortness of breath.    Cardiovascular:  Negative for chest pain.   Gastrointestinal:  Positive for abdominal pain. Negative for nausea.   Genitourinary:  Negative for dysuria.   Musculoskeletal:  Negative for back pain.   Skin:  Negative for rash.   Neurological:  Negative for weakness.   Hematological:  Does not bruise/bleed easily.   All other systems reviewed and are negative.      Physical Exam     Initial Vitals [10/30/23 0811]   BP Pulse Resp Temp SpO2   93/63  (!) 58 18 97.7 °F (36.5 °C) 97 %      MAP       --         Physical Exam    Nursing note and vitals reviewed.  Constitutional: She appears well-developed.   HENT:   Head: Normocephalic and atraumatic.   Right Ear: External ear normal.   Left Ear: External ear normal.   Nose: Nose normal.   Mouth/Throat: Oropharynx is clear and moist. No oropharyngeal exudate.   Eyes: Conjunctivae and EOM are normal. Pupils are equal, round, and reactive to light. Right eye exhibits no discharge. Left eye exhibits no discharge.   Neck: Neck supple. No tracheal deviation present. No JVD present.   Normal range of motion.  Cardiovascular:  Normal rate, regular rhythm, normal heart sounds and intact distal pulses.     Exam reveals no gallop and no friction rub.       No murmur heard.  Pulmonary/Chest: Breath sounds normal. No stridor. No respiratory distress. She has no wheezes. She has no rhonchi. She has no rales.   Abdominal: Abdomen is soft. Bowel sounds are normal. She exhibits no distension and no mass. There is abdominal tenderness. There is no rebound and no guarding.   Musculoskeletal:         General: Normal range of motion.      Cervical back: Normal range of motion and neck supple.     Neurological: She is alert and oriented to person, place, and time. She has normal strength. No cranial nerve deficit.   Skin: Skin is warm and dry. No rash and no abscess noted. No erythema.   Psychiatric: She has a normal mood and affect. Her behavior is normal. Judgment and thought content normal.         ED Course   Procedures  Labs Reviewed   URINALYSIS, REFLEX TO URINE CULTURE - Abnormal; Notable for the following components:       Result Value    Blood, UA Trace-Intact (*)     All other components within normal limits   CBC WITH DIFFERENTIAL - Abnormal; Notable for the following components:    Hgb 10.9 (*)     Hct 36.9 (*)     MCH 25.7 (*)     MCHC 29.5 (*)     All other components within normal limits   URINALYSIS, MICROSCOPIC -  Abnormal; Notable for the following components:    Squamous Epithelial Cells, UA Few (*)     All other components within normal limits   LIPASE - Normal   CBC W/ AUTO DIFFERENTIAL    Narrative:     The following orders were created for panel order CBC W/ AUTO DIFFERENTIAL.  Procedure                               Abnormality         Status                     ---------                               -----------         ------                     CBC with Differential[[id number]]       Abnormal            Final result                 Please view results for these tests on the individual orders.   COMPREHENSIVE METABOLIC PANEL          Imaging Results              CT Renal Stone Study ABD Pelvis WO (Final result)  Result time 10/30/23 09:42:47      Final result by Ellis Reardon MD (10/30/23 09:42:47)                   Impression:      1.  No renal calculi or acute obstructive uropathy.    2.  Pelvic small free fluid is favored to be physiologic.      Electronically signed by: Ellis Reardon  Date:    10/30/2023  Time:    09:42               Narrative:    EXAMINATION:  CT RENAL STONE STUDY ABD PELVIS WO    CLINICAL HISTORY:  Nephrolithiasis, symptomatic/complicated;    TECHNIQUE:  Multidetector axial images were obtained from the  diaphragms to below symphysis pubis without the administration of IV contrast. Oral contrast was not administered.    Dose length product of 230 mGycm. Automated exposure control was utilized to minimize radiation dose.    COMPARISON:  None available    FINDINGS:  Included lungs are without suspicious nodularity, acute air space infiltrates or fluid within the pleural spaces.    Within limitations of noncontrast technique, no acute findings of the liver, pancreas and spleen identified. Gallbladder wall is not thickened and there is no intraluminal calcified calculus. No apparent biliary dilation.    The adrenal glands noncontrast evaluation is unremarkable. The kidneys are unremarkable in  size and contour. There is no hydronephrosis or nephrolithiasis. The ureters appear normal in course and diameter without intra ureteral stone.    The stomach is mostly decompressed.  Small bowel is normal in caliber. There is no free air or free fluid.  Appendix is not visualized.  Pericolonic fat is preserved without acute inflammatory strandings. There is no evidence for bowel obstruction.    Urinary bladder appears within normal limits. No intravesical stone identified.  Uterus is retroverted.  There is small pelvic free fluid.                                       CT Head Without Contrast (Final result)  Result time 10/30/23 08:57:00      Final result by Bruce Donnelly MD (10/30/23 08:57:00)                   Impression:      No acute intracranial abnormality.      Electronically signed by: Bruce Donnelly MD  Date:    10/30/2023  Time:    08:57               Narrative:    EXAMINATION:  CT head without contrast    CLINICAL HISTORY:  Dizziness, persistent, recurrent    TECHNIQUE:  Routine CT of the head was performed without contrast    Total DLP: 1105.1 mGy.cm    Automatic exposure control was utilized to reduce the patient's dose    COMPARISON:  08/23/2022    FINDINGS:  No acute intra-cranial hemorrhage, midline shift, mass effect or extra-axial collection.    The ventricles are normal in size and configuration.    No sulcal effacement.  Normal grey-white matter differentiation.    Visualized osseous structures are unremarkable.  Visualized paranasal sinuses and mastoid air cells are clear.                                       X-Ray Chest AP Portable (Final result)  Result time 10/30/23 08:48:10      Final result by Chi Velazquez MD (10/30/23 08:48:10)                   Impression:      No acute findings in the chest      Electronically signed by: Chi Velazquez MD  Date:    10/30/2023  Time:    08:48               Narrative:    EXAMINATION:  XR CHEST AP PORTABLE    CLINICAL HISTORY:  chest  pain;    COMPARISON:  None    FINDINGS:  Single view of the chest shows no focal consolidation, pneumothorax or pleural effusion.  Cardiac silhouette and pulmonary vasculature are normal.                                       Medications   sodium chloride 0.9% bolus 1,000 mL 1,000 mL (0 mLs Intravenous Stopped 10/30/23 2685)     Medical Decision Making  COVID flu bronchitis cholecystitis gastroenteritis    Amount and/or Complexity of Data Reviewed  Labs: ordered.  Radiology: ordered.                               Clinical Impression:   Final diagnoses:  [R52] Pain  [R10.9] Abdominal pain, unspecified abdominal location (Primary)        ED Disposition Condition    Discharge Stable          ED Prescriptions    None       Follow-up Information       Follow up With Specialties Details Why Contact Info    Lyla Jimenez, FNP Family Medicine   [address] [facility] [address]  [phone]               Judah Garces MD  10/30/23 3287

## 2023-12-20 ENCOUNTER — PATIENT OUTREACH (OUTPATIENT)
Dept: ADMINISTRATIVE | Facility: HOSPITAL | Age: 43
End: 2023-12-20
Payer: OTHER GOVERNMENT

## 2023-12-20 NOTE — LETTER
AUTHORIZATION FOR RELEASE OF   CONFIDENTIAL INFORMATION    Dear Dr. Joe Dimas ,    We are seeing Buffy Osullivan, date of birth 1980, in the clinic at [facility]. Lyla Jimenez FNP is the patient's PCP. Buffy Osullivan has an outstanding lab/procedure at the time we reviewed her chart. In order to help keep her health information updated, she has authorized us to request the following medical record(s):        (  )  MAMMOGRAM                                      (  )  COLONOSCOPY      ( X )  PAP SMEAR                                          (  )  OUTSIDE LAB RESULTS     (  )  DEXA SCAN                                          (  )  EYE EXAM            (  )  FOOT EXAM                                          (  )  ENTIRE RECORD     (  )  OUTSIDE IMMUNIZATIONS                 (  )  _______________         Please fax records to [facility], Rami, Cheramie W, FNP, [phone]       If you have any questions, please contact Trin[facility] at [phone]            Patient Name: Buffy Osullivan  : 1980  Patient Phone #: [phone]

## 2023-12-20 NOTE — PROGRESS NOTES
The following record(s)  below were uploaded for Health Maintenance .            PAP SMEAR 01/23/2014 2014 PAP per Francisca -completed by Dr. Joe Dimas,Record request sent to see if more up to date exam was completed.     Had appointment with Dr. Roblero scheduled for 12/06/23 @ 0945 -no visit note in [facility]    Population Health Outreach.

## 2024-02-01 ENCOUNTER — OFFICE VISIT (OUTPATIENT)
Dept: FAMILY MEDICINE | Facility: CLINIC | Age: 44
End: 2024-02-01
Payer: OTHER GOVERNMENT

## 2024-02-01 ENCOUNTER — LAB VISIT (OUTPATIENT)
Dept: LAB | Facility: HOSPITAL | Age: 44
End: 2024-02-01
Attending: NURSE PRACTITIONER
Payer: OTHER GOVERNMENT

## 2024-02-01 VITALS
TEMPERATURE: 99 F | RESPIRATION RATE: 17 BRPM | DIASTOLIC BLOOD PRESSURE: 77 MMHG | OXYGEN SATURATION: 99 % | BODY MASS INDEX: 22.48 KG/M2 | HEART RATE: 67 BPM | WEIGHT: 104.19 LBS | HEIGHT: 57 IN | SYSTOLIC BLOOD PRESSURE: 116 MMHG

## 2024-02-01 DIAGNOSIS — I10 HYPERTENSION, UNSPECIFIED TYPE: Primary | ICD-10-CM

## 2024-02-01 DIAGNOSIS — D64.9 ANEMIA, UNSPECIFIED TYPE: ICD-10-CM

## 2024-02-01 LAB
BASOPHILS # BLD AUTO: 0.06 X10(3)/MCL
BASOPHILS NFR BLD AUTO: 0.9 %
EOSINOPHIL # BLD AUTO: 0.06 X10(3)/MCL (ref 0–0.9)
EOSINOPHIL NFR BLD AUTO: 0.9 %
ERYTHROCYTE [DISTWIDTH] IN BLOOD BY AUTOMATED COUNT: 13.8 % (ref 11.5–17)
FERRITIN SERPL-MCNC: 7.6 NG/ML (ref 4.63–204)
HCT VFR BLD AUTO: 39.1 % (ref 37–47)
HGB BLD-MCNC: 12.1 G/DL (ref 12–16)
IMM GRANULOCYTES # BLD AUTO: 0 X10(3)/MCL (ref 0–0.04)
IMM GRANULOCYTES NFR BLD AUTO: 0 %
IRON SATN MFR SERPL: 8 % (ref 20–50)
IRON SERPL-MCNC: 29 UG/DL (ref 50–170)
LYMPHOCYTES # BLD AUTO: 1.36 X10(3)/MCL (ref 0.6–4.6)
LYMPHOCYTES NFR BLD AUTO: 20.9 %
MCH RBC QN AUTO: 26.7 PG (ref 27–31)
MCHC RBC AUTO-ENTMCNC: 30.9 G/DL (ref 33–36)
MCV RBC AUTO: 86.1 FL (ref 80–94)
MONOCYTES # BLD AUTO: 0.45 X10(3)/MCL (ref 0.1–1.3)
MONOCYTES NFR BLD AUTO: 6.9 %
NEUTROPHILS # BLD AUTO: 4.58 X10(3)/MCL (ref 2.1–9.2)
NEUTROPHILS NFR BLD AUTO: 70.4 %
PLATELET # BLD AUTO: 263 X10(3)/MCL (ref 130–400)
PMV BLD AUTO: 11 FL (ref 7.4–10.4)
RBC # BLD AUTO: 4.54 X10(6)/MCL (ref 4.2–5.4)
TIBC SERPL-MCNC: 330 UG/DL (ref 70–310)
TIBC SERPL-MCNC: 359 UG/DL (ref 250–450)
TRANSFERRIN SERPL-MCNC: 343 MG/DL (ref 180–382)
WBC # SPEC AUTO: 6.51 X10(3)/MCL (ref 4.5–11.5)

## 2024-02-01 PROCEDURE — 82728 ASSAY OF FERRITIN: CPT

## 2024-02-01 PROCEDURE — 85025 COMPLETE CBC W/AUTO DIFF WBC: CPT

## 2024-02-01 PROCEDURE — 36415 COLL VENOUS BLD VENIPUNCTURE: CPT

## 2024-02-01 PROCEDURE — 99213 OFFICE O/P EST LOW 20 MIN: CPT | Mod: ,,, | Performed by: NURSE PRACTITIONER

## 2024-02-01 PROCEDURE — 83540 ASSAY OF IRON: CPT

## 2024-02-01 NOTE — PROGRESS NOTES
"  SUBJECTIVE:     History of Present Illness      Chief Complaint: Follow-up (3 month follow up visit for anemia, here to discuss lab results.)    HPI:  Patient is a 43 y.o. year old female who presents to clinic for  anemia follow-up.  Patient does report compliance with iron supplement.    Review of Systems:    Review of Systems    12 point review of systems conducted, negative except as stated in the history of present illness. See HPI for details.     Previous History    Lyla Jimenez, CLARENCE  Review of patient's allergies indicates:  No Known Allergies    Past Medical History:   Diagnosis Date    Anemia     Hypertension      Current Outpatient Medications   Medication Instructions    ferrous sulfate 325 mg, Oral, 3 times daily with meals    lisinopriL 10 mg, Oral, Daily     Past Surgical History:   Procedure Laterality Date    APPENDECTOMY  05/04/1999    OOPHORECTOMY  05/04/1999    right ovary    TUBAL LIGATION  05/04/1999    TUMOR REMOVAL  05/04/1999    right ovary     Family History   Problem Relation Age of Onset    Hypertension Mother     Hypertension Father     Stroke Father     Hypertension Sister     Lupus Sister     Breast cancer Neg Hx         neg fam history of breast cancer       Social History     Tobacco Use    Smoking status: Every Day     Types: Vaping with nicotine    Smokeless tobacco: Never   Substance Use Topics    Alcohol use: Yes     Alcohol/week: 3.0 standard drinks of alcohol     Types: 3 Drinks containing 0.5 oz of alcohol per week    Drug use: Never        Health Maintenance      Health Maintenance   Topic Date Due    Hepatitis C Screening  Never done    TETANUS VACCINE  Never done    Mammogram  10/25/2024    Lipid Panel  Completed       OBJECTIVE:     Physical Exam      Vital Signs Reviewed   Visit Vitals  /77 (BP Location: Left arm, Patient Position: Sitting)   Pulse 67   Temp 98.5 °F (36.9 °C) (Oral)   Resp 17   Ht 4' 9" (1.448 m)   Wt 47.3 kg (104 lb 3.2 oz)   LMP 01/25/2024 " (Exact Date)   SpO2 99%   BMI 22.55 kg/m²       Physical Exam    Physical Exam:  General: Alert, well nourished, no acute distress, non-toxic appearing.   Eyes: Anicteric sclera, without conjunctival injection, normal lids, no purulent drainage, EOMs grossly intact.   Ears: No tragal tenderness. Tympanic membranes intact, pearly grey, without effusion or erythema and with a positive light reflex.   Mouth: Posterior pharynx without erythema. No exudate, ulcerations, or lesion. No tonsillar swelling.   Neck: Supple, full ROM, no rigidity, no cervical adenopathy.   Cardio: Normal rate and rhythm    Resp: Respirations even and unlabored, clear to auscultation bilaterally.   Abd: No ecchymosis or distension. Normal bowel sounds in all 4 quadrants. No tenderness to palpation. No rebound tenderness or guarding. No CVA tenderness.   Skin: No rashes or open lesions noted.   MSK: No swelling. No abrasions or signs of trauma. Ambulating without assistance.   Neuro: Alert,oriented No focal deficits noted. Facial expressions even.   Psych: Cooperative, Normal affect      Procedures    Procedures     Labs     Results for orders placed or performed in visit on 02/01/24   Iron and TIBC   Result Value Ref Range    Iron Binding Capacity Unsaturated 330 (H) 70 - 310 ug/dL    Iron Level 29 (L) 50 - 170 ug/dL    Transferrin 343 180 - 382 mg/dL    Iron Binding Capacity Total 359 250 - 450 ug/dL    Iron Saturation 8 (L) 20 - 50 %   Ferritin   Result Value Ref Range    Ferritin Level 7.60 4.63 - 204.00 ng/mL   CBC with Differential   Result Value Ref Range    WBC 6.51 4.50 - 11.50 x10(3)/mcL    RBC 4.54 4.20 - 5.40 x10(6)/mcL    Hgb 12.1 12.0 - 16.0 g/dL    Hct 39.1 37.0 - 47.0 %    MCV 86.1 80.0 - 94.0 fL    MCH 26.7 (L) 27.0 - 31.0 pg    MCHC 30.9 (L) 33.0 - 36.0 g/dL    RDW 13.8 11.5 - 17.0 %    Platelet 263 130 - 400 x10(3)/mcL    MPV 11.0 (H) 7.4 - 10.4 fL    Neut % 70.4 %    Lymph % 20.9 %    Mono % 6.9 %    Eos % 0.9 %    Basophil %  0.9 %    Lymph # 1.36 0.6 - 4.6 x10(3)/mcL    Neut # 4.58 2.1 - 9.2 x10(3)/mcL    Mono # 0.45 0.1 - 1.3 x10(3)/mcL    Eos # 0.06 0 - 0.9 x10(3)/mcL    Baso # 0.06 <=0.2 x10(3)/mcL    IG# 0.00 0 - 0.04 x10(3)/mcL    IG% 0.0 %       Chemistry:  Lab Results   Component Value Date     10/30/2023    K 4.9 10/30/2023    CHLORIDE 107 10/30/2023    BUN 11.6 10/30/2023    CREATININE 0.88 10/30/2023    EGFRNORACEVR >60 10/30/2023    GLUCOSE 91 10/30/2023    CALCIUM 9.3 10/30/2023    ALKPHOS 53 10/30/2023    LABPROT 7.7 10/30/2023    ALBUMIN 4.2 10/30/2023    AST 31 10/30/2023    ALT 15 10/30/2023    SIZXDLID39QA 42.9 09/29/2023    TSH 1.999 09/29/2023    AFIMZA3PXBL 1.07 09/29/2023        Lab Results   Component Value Date    HGBA1C 4.9 09/29/2023        Hematology:  Lab Results   Component Value Date    WBC 6.51 02/01/2024    HGB 12.1 02/01/2024    HCT 39.1 02/01/2024     02/01/2024       Lipid Panel:  Lab Results   Component Value Date    CHOL 169 09/29/2023    HDL 57 09/29/2023    .00 09/29/2023    TRIG 42 09/29/2023    TOTALCHOLEST 3 09/29/2023        Urine:  Lab Results   Component Value Date    COLORUA Yellow 10/30/2023    APPEARANCEUA Clear 10/30/2023    SGUA 1.025 10/30/2023    PHUA 5.5 10/30/2023    PROTEINUA Negative 10/30/2023    GLUCOSEUA Negative 10/30/2023    KETONESUA Negative 10/30/2023    BLOODUA Trace-Intact (A) 10/30/2023    NITRITESUA Negative 10/30/2023    LEUKOCYTESUR Negative 10/30/2023    RBCUA 0-2 10/30/2023    WBCUA 0-2 10/30/2023    BACTERIA Rare 10/30/2023    SQEPUA 2-20 03/18/2019    HYALINECASTS 0-2 (A) 03/18/2019         Assessment            ICD-10-CM ICD-9-CM   1. Hypertension, unspecified type  I10 401.9   2. Anemia, unspecified type  D64.9 285.9       Plan       1. Hypertension, unspecified type  Stable   Controlled with medication.   Continue current medication as prescribed   Low salt/ DASH diet   Discussed lifestyle modifications.   encourage aerobic excise at least 30  mins a day   Monitor BP daily goal less than 140/90.   Denies headaches, blurred vision, or dizziness    2. Anemia, unspecified type  Continue OTC iron supplement t.i.d.  Iron supplements and food      Medication List with Changes/Refills   Current Medications    FERROUS SULFATE 325 (65 FE) MG EC TABLET    Take 1 tablet (325 mg total) by mouth 3 (three) times daily with meals.    LISINOPRIL 10 MG TABLET    Take 1 tablet (10 mg total) by mouth once daily.       No follow-ups on file.   No follow-ups on file. In addition to their scheduled follow up, the patient has also been instructed to follow up on as needed basis.   Future Appointments   Date Time Provider Department Center   10/24/2024  1:00 PM Lyla Jimenez, [facility]

## 2024-02-05 ENCOUNTER — TELEPHONE (OUTPATIENT)
Dept: FAMILY MEDICINE | Facility: CLINIC | Age: 44
End: 2024-02-05
Payer: OTHER GOVERNMENT

## 2024-02-05 RX ORDER — FERROUS SULFATE 325(65) MG
325 TABLET, DELAYED RELEASE (ENTERIC COATED) ORAL
Qty: 60 TABLET | Refills: 0 | Status: SHIPPED | OUTPATIENT
Start: 2024-02-05

## 2024-02-05 NOTE — TELEPHONE ENCOUNTER
----- Message from CLARENCE Kelley sent at 2/5/2024  1:11 PM CST -----  Please inform patient of results.    1.  Continue iron supplement to 2 times a day as tolerated.  Discussed importance of iron rich supplement and diet  All other results within acceptable ranges.

## 2024-02-05 NOTE — TELEPHONE ENCOUNTER
Reported to patient results of lab.  Instructed on iron supplements BID as tolerated as well as a diet rich in iron.  Keep follow up appointment as scheduled and call prn prior.  Voices understanding and agrees.

## 2024-02-05 NOTE — PROGRESS NOTES
Please inform patient of results.    1.  Continue iron supplement to 2 times a day as tolerated.  Discussed importance of iron rich supplement and diet  All other results within acceptable ranges.

## 2024-07-03 PROCEDURE — 87624 HPV HI-RISK TYP POOLED RSLT: CPT | Performed by: OBSTETRICS & GYNECOLOGY

## 2024-07-10 ENCOUNTER — HOSPITAL ENCOUNTER (OUTPATIENT)
Dept: RADIOLOGY | Facility: HOSPITAL | Age: 44
Discharge: HOME | End: 2024-07-10
Attending: OBSTETRICS & GYNECOLOGY
Payer: OTHER GOVERNMENT

## 2024-07-10 DIAGNOSIS — R10.2 PELVIC PAIN: ICD-10-CM

## 2024-07-10 PROCEDURE — 76856 US EXAM PELVIC COMPLETE: CPT | Mod: TC

## 2024-07-10 PROCEDURE — 76830 TRANSVAGINAL US NON-OB: CPT | Mod: TC

## 2024-10-28 DIAGNOSIS — Z00.00 WELLNESS EXAMINATION: ICD-10-CM

## 2024-10-28 DIAGNOSIS — I10 HYPERTENSION, UNSPECIFIED TYPE: Primary | ICD-10-CM

## 2024-10-28 DIAGNOSIS — D64.9 ANEMIA, UNSPECIFIED TYPE: ICD-10-CM
